# Patient Record
Sex: MALE | Race: AMERICAN INDIAN OR ALASKA NATIVE | NOT HISPANIC OR LATINO
[De-identification: names, ages, dates, MRNs, and addresses within clinical notes are randomized per-mention and may not be internally consistent; named-entity substitution may affect disease eponyms.]

---

## 2020-01-15 ENCOUNTER — TRANSCRIPTION ENCOUNTER (OUTPATIENT)
Age: 35
End: 2020-01-15

## 2020-02-21 ENCOUNTER — TRANSCRIPTION ENCOUNTER (OUTPATIENT)
Age: 35
End: 2020-02-21

## 2020-06-04 PROBLEM — Z00.00 ENCOUNTER FOR PREVENTIVE HEALTH EXAMINATION: Status: ACTIVE | Noted: 2020-06-04

## 2020-06-05 ENCOUNTER — APPOINTMENT (OUTPATIENT)
Dept: ORTHOPEDIC SURGERY | Facility: CLINIC | Age: 35
End: 2020-06-05
Payer: MEDICAID

## 2020-06-05 VITALS
BODY MASS INDEX: 25.01 KG/M2 | SYSTOLIC BLOOD PRESSURE: 101 MMHG | WEIGHT: 165 LBS | DIASTOLIC BLOOD PRESSURE: 69 MMHG | HEART RATE: 78 BPM | HEIGHT: 68 IN

## 2020-06-05 VITALS — TEMPERATURE: 98.1 F

## 2020-06-05 DIAGNOSIS — S83.241A OTHER TEAR OF MEDIAL MENISCUS, CURRENT INJURY, RIGHT KNEE, INITIAL ENCOUNTER: ICD-10-CM

## 2020-06-05 PROCEDURE — 73562 X-RAY EXAM OF KNEE 3: CPT | Mod: RT

## 2020-06-05 PROCEDURE — 99204 OFFICE O/P NEW MOD 45 MIN: CPT

## 2020-06-13 ENCOUNTER — TRANSCRIPTION ENCOUNTER (OUTPATIENT)
Age: 35
End: 2020-06-13

## 2020-06-13 ENCOUNTER — APPOINTMENT (OUTPATIENT)
Dept: MRI IMAGING | Facility: CLINIC | Age: 35
End: 2020-06-13
Payer: MEDICAID

## 2020-06-13 ENCOUNTER — OUTPATIENT (OUTPATIENT)
Dept: OUTPATIENT SERVICES | Facility: HOSPITAL | Age: 35
LOS: 1 days | End: 2020-06-13

## 2020-06-13 DIAGNOSIS — S83.241A OTHER TEAR OF MEDIAL MENISCUS, CURRENT INJURY, RIGHT KNEE, INITIAL ENCOUNTER: ICD-10-CM

## 2020-06-13 PROCEDURE — 73721 MRI JNT OF LWR EXTRE W/O DYE: CPT | Mod: 26,RT

## 2020-06-17 ENCOUNTER — APPOINTMENT (OUTPATIENT)
Dept: ORTHOPEDIC SURGERY | Facility: CLINIC | Age: 35
End: 2020-06-17
Payer: MEDICAID

## 2020-06-17 DIAGNOSIS — M25.561 PAIN IN RIGHT KNEE: ICD-10-CM

## 2020-06-17 DIAGNOSIS — G89.29 PAIN IN RIGHT KNEE: ICD-10-CM

## 2020-06-17 PROCEDURE — 99214 OFFICE O/P EST MOD 30 MIN: CPT

## 2020-11-15 ENCOUNTER — TRANSCRIPTION ENCOUNTER (OUTPATIENT)
Age: 35
End: 2020-11-15

## 2020-12-16 ENCOUNTER — TRANSCRIPTION ENCOUNTER (OUTPATIENT)
Age: 35
End: 2020-12-16

## 2021-02-04 ENCOUNTER — TRANSCRIPTION ENCOUNTER (OUTPATIENT)
Age: 36
End: 2021-02-04

## 2021-03-17 ENCOUNTER — EMERGENCY (EMERGENCY)
Facility: HOSPITAL | Age: 36
LOS: 1 days | Discharge: HOME | End: 2021-03-17
Attending: HOSPITALIST | Admitting: HOSPITALIST
Payer: MEDICAID

## 2021-03-17 VITALS
HEART RATE: 98 BPM | OXYGEN SATURATION: 100 % | SYSTOLIC BLOOD PRESSURE: 140 MMHG | DIASTOLIC BLOOD PRESSURE: 88 MMHG | RESPIRATION RATE: 20 BRPM | TEMPERATURE: 99 F

## 2021-03-17 LAB
BASE EXCESS BLDV CALC-SCNC: 3 MMOL/L — HIGH (ref -3–2)
BASOPHILS # BLD AUTO: 0.05 K/UL — SIGNIFICANT CHANGE UP (ref 0–0.2)
BASOPHILS NFR BLD AUTO: 0.5 % — SIGNIFICANT CHANGE UP (ref 0–2)
BLOOD GAS VENOUS - CREATININE: 0.8 MG/DL — SIGNIFICANT CHANGE UP (ref 0.5–1.3)
BLOOD GAS VENOUS COMPREHENSIVE RESULT: SIGNIFICANT CHANGE UP
CHLORIDE BLDV-SCNC: 107 MMOL/L — SIGNIFICANT CHANGE UP (ref 96–108)
D DIMER BLD IA.RAPID-MCNC: <150 NG/ML DDU — SIGNIFICANT CHANGE UP
EOSINOPHIL # BLD AUTO: 0.22 K/UL — SIGNIFICANT CHANGE UP (ref 0–0.5)
EOSINOPHIL NFR BLD AUTO: 2.3 % — SIGNIFICANT CHANGE UP (ref 0–6)
GAS PNL BLDV: 138 MMOL/L — SIGNIFICANT CHANGE UP (ref 136–146)
GLUCOSE BLDV-MCNC: 75 MG/DL — SIGNIFICANT CHANGE UP (ref 70–99)
HCO3 BLDV-SCNC: 24 MMOL/L — SIGNIFICANT CHANGE UP (ref 20–27)
HCT VFR BLD CALC: 47.4 % — SIGNIFICANT CHANGE UP (ref 39–50)
HCT VFR BLDA CALC: 48.5 % — SIGNIFICANT CHANGE UP (ref 39–51)
HGB BLD CALC-MCNC: 15.8 G/DL — SIGNIFICANT CHANGE UP (ref 13–17)
HGB BLD-MCNC: 15.6 G/DL — SIGNIFICANT CHANGE UP (ref 13–17)
IANC: 4.86 K/UL — SIGNIFICANT CHANGE UP (ref 1.5–8.5)
IMM GRANULOCYTES NFR BLD AUTO: 0.3 % — SIGNIFICANT CHANGE UP (ref 0–1.5)
LACTATE BLDV-MCNC: 2.3 MMOL/L — HIGH (ref 0.5–2)
LYMPHOCYTES # BLD AUTO: 3.5 K/UL — HIGH (ref 1–3.3)
LYMPHOCYTES # BLD AUTO: 36.6 % — SIGNIFICANT CHANGE UP (ref 13–44)
MCHC RBC-ENTMCNC: 28 PG — SIGNIFICANT CHANGE UP (ref 27–34)
MCHC RBC-ENTMCNC: 32.9 GM/DL — SIGNIFICANT CHANGE UP (ref 32–36)
MCV RBC AUTO: 85.1 FL — SIGNIFICANT CHANGE UP (ref 80–100)
MONOCYTES # BLD AUTO: 0.91 K/UL — HIGH (ref 0–0.9)
MONOCYTES NFR BLD AUTO: 9.5 % — SIGNIFICANT CHANGE UP (ref 2–14)
NEUTROPHILS # BLD AUTO: 4.86 K/UL — SIGNIFICANT CHANGE UP (ref 1.8–7.4)
NEUTROPHILS NFR BLD AUTO: 50.8 % — SIGNIFICANT CHANGE UP (ref 43–77)
NRBC # BLD: 0 /100 WBCS — SIGNIFICANT CHANGE UP
NRBC # FLD: 0 K/UL — SIGNIFICANT CHANGE UP
PCO2 BLDV: 58 MMHG — HIGH (ref 41–51)
PH BLDV: 7.32 — SIGNIFICANT CHANGE UP (ref 7.32–7.43)
PLATELET # BLD AUTO: 191 K/UL — SIGNIFICANT CHANGE UP (ref 150–400)
PO2 BLDV: <24 MMHG — SIGNIFICANT CHANGE UP (ref 35–40)
POTASSIUM BLDV-SCNC: 3.9 MMOL/L — SIGNIFICANT CHANGE UP (ref 3.4–4.5)
RBC # BLD: 5.57 M/UL — SIGNIFICANT CHANGE UP (ref 4.2–5.8)
RBC # FLD: 13.9 % — SIGNIFICANT CHANGE UP (ref 10.3–14.5)
SAO2 % BLDV: 33 % — LOW (ref 60–85)
WBC # BLD: 9.57 K/UL — SIGNIFICANT CHANGE UP (ref 3.8–10.5)
WBC # FLD AUTO: 9.57 K/UL — SIGNIFICANT CHANGE UP (ref 3.8–10.5)

## 2021-03-17 PROCEDURE — 99285 EMERGENCY DEPT VISIT HI MDM: CPT | Mod: 25

## 2021-03-17 PROCEDURE — 93010 ELECTROCARDIOGRAM REPORT: CPT

## 2021-03-17 PROCEDURE — 71046 X-RAY EXAM CHEST 2 VIEWS: CPT | Mod: 26

## 2021-03-17 NOTE — ED PROVIDER NOTE - PATIENT PORTAL LINK FT
You can access the FollowMyHealth Patient Portal offered by [facility] by registering at the following website: [email or web address]. By joining [facility]’s FollowMyHealth portal, you will also be able to view your health information using other applications (apps) compatible with our system.

## 2021-03-17 NOTE — ED ADULT TRIAGE NOTE - CHIEF COMPLAINT QUOTE
Pt is c/o LEWIS since three days ago. Pt says he is having difficulty breathing after walking from one room to the other. Since this evening has chest pain when he takes a deep breath. Denies radiation of pain or dizziness or sweating but had palpitation earlier this afternoon. Pt is  also c/o abdominal pain on and off for a month for which he saw a doctor and took some medicine which did not help. No PMH and is not on any medications at home.

## 2021-03-17 NOTE — ED ADULT NURSE NOTE - OBJECTIVE STATEMENT
35 year old male  complaining of LEWIS, chest pain, palpitations for 3 days. Pt states just walking from one room to another he gets SOB and he could feel his heart beating fast. Pt. states he took his heart rate and it was 125. Pt is mostly on the left side  and described as pinching. Pt denies any cardiac hx, history of blood clots. Pt denies swelling in b/l le. Pt denies fever, chills, nausea, vomiting, headache, dizziness. Pt denies sicks contacts,.20g placed on left a/c labs sent.Pt awaiting imaging. 35 year old male  complaining of LEWIS, chest pain, palpitations for 3 days. Pt states just walking from one room to another he gets SOB and he could feel his heart beating fast. Pt. states he took his heart rate and it was 125. Pt is mostly on the left side  and described as pinching. Pt denies any cardiac hx, history of blood clots. Pt denies swelling in b/l le. Pt denies fever, chills, nausea, vomiting, headache, dizziness. Pt denies sicks contacts. Lungs clear, s1, s2 present. 20g placed on left a/c labs sent. Pt awaiting imaging.

## 2021-03-17 NOTE — ED PROVIDER NOTE - ATTENDING CONTRIBUTION TO CARE
35M with hx of hypertriglyceridemia not on medications p/w garcia for the past 3 days. patient states he feels very sob after walking short distances and feels his heart facing. also reports left sided pinching chest pain. non-radiating. no cough, fever. + tachycardia. no le edema, smoking or travel.    ***GEN - NAD; well appearing; A+O x3 ***HEAD - NC/AT ***EYES/NOSE - PERRL, EOMI, mucous membranes moist, no discharge ***THROAT: Oral cavity and pharynx normal. No inflammation, swelling, exudate, or lesions.  ***NECK: Neck supple, non-tender without lymphadenopathy, no masses, no thyromegaly.   ***PULMONARY - CTA b/l, symmetric breath sounds. ***CARDIAC -s1s2, tachycardiac, no M,G,R  ***ABDOMEN - +BS, ND, NT, soft, no guarding, no rebound, no masses   ***BACK - no CVA tenderness, Normal  spine ***EXTREMITIES - symmetric pulses, 2+ dp, capillary refill < 2 seconds, no clubbing, no cyanosis, no edema ***SKIN - no rash or bruising   ***NEUROLOGIC - alert, CN 2-12 intact    MDM: 35M with garcia and chest pain. r/o pe, acs.

## 2021-03-17 NOTE — ED PROVIDER NOTE - OBJECTIVE STATEMENT
36 y/o M h/o HLD p/w LEWIS x 3 days. Pt reports becoming SOB after walking short distances and than begins to feels his heart beating fast. Pt reports L sided pinching chest pain. States he checked his heart rate last night and it was 125 while at rest. Pt denies any h/o PE/DVT. No family history of CAD. No fever, chills, n/v/d, headache, dizziness.

## 2021-03-17 NOTE — ED PROVIDER NOTE - CLINICAL SUMMARY MEDICAL DECISION MAKING FREE TEXT BOX
36 y/o M with LEWIS a/w palpitations, chest pain.   plan  - labs  - trop  - ekg  - cxr  - d-dimer  - reassess CDU vs d/c with prompt cardiology f/u

## 2021-03-18 VITALS
OXYGEN SATURATION: 100 % | TEMPERATURE: 98 F | SYSTOLIC BLOOD PRESSURE: 117 MMHG | HEART RATE: 89 BPM | RESPIRATION RATE: 16 BRPM | DIASTOLIC BLOOD PRESSURE: 78 MMHG

## 2021-03-18 LAB
ALBUMIN SERPL ELPH-MCNC: 5.2 G/DL — HIGH (ref 3.3–5)
ALP SERPL-CCNC: 107 U/L — SIGNIFICANT CHANGE UP (ref 40–120)
ALT FLD-CCNC: 175 U/L — HIGH (ref 4–41)
ANION GAP SERPL CALC-SCNC: 15 MMOL/L — HIGH (ref 7–14)
AST SERPL-CCNC: 86 U/L — HIGH (ref 4–40)
BILIRUB SERPL-MCNC: 0.5 MG/DL — SIGNIFICANT CHANGE UP (ref 0.2–1.2)
BUN SERPL-MCNC: 10 MG/DL — SIGNIFICANT CHANGE UP (ref 7–23)
CALCIUM SERPL-MCNC: 10.3 MG/DL — SIGNIFICANT CHANGE UP (ref 8.4–10.5)
CHLORIDE SERPL-SCNC: 99 MMOL/L — SIGNIFICANT CHANGE UP (ref 98–107)
CO2 SERPL-SCNC: 23 MMOL/L — SIGNIFICANT CHANGE UP (ref 22–31)
CREAT SERPL-MCNC: 0.81 MG/DL — SIGNIFICANT CHANGE UP (ref 0.5–1.3)
GLUCOSE SERPL-MCNC: 68 MG/DL — LOW (ref 70–99)
NT-PROBNP SERPL-SCNC: 10 PG/ML — SIGNIFICANT CHANGE UP
POTASSIUM SERPL-MCNC: 4.3 MMOL/L — SIGNIFICANT CHANGE UP (ref 3.5–5.3)
POTASSIUM SERPL-SCNC: 4.3 MMOL/L — SIGNIFICANT CHANGE UP (ref 3.5–5.3)
PROT SERPL-MCNC: 8.4 G/DL — HIGH (ref 6–8.3)
SARS-COV-2 RNA SPEC QL NAA+PROBE: SIGNIFICANT CHANGE UP
SODIUM SERPL-SCNC: 137 MMOL/L — SIGNIFICANT CHANGE UP (ref 135–145)
TROPONIN T, HIGH SENSITIVITY RESULT: 7 NG/L — SIGNIFICANT CHANGE UP
TROPONIN T, HIGH SENSITIVITY RESULT: <6 NG/L — SIGNIFICANT CHANGE UP

## 2021-08-20 ENCOUNTER — TRANSCRIPTION ENCOUNTER (OUTPATIENT)
Age: 36
End: 2021-08-20

## 2021-11-10 NOTE — ED ADULT NURSE NOTE - NSFALLRSKPASTHIST_ED_ALL_ED
How Severe Is Your Skin Lesion?: moderate Is This A New Presentation, Or A Follow-Up?: Skin Lesion I will SWITCH the dose or number of times a day I take the medications listed below when I get home from the hospital:  None no

## 2021-11-22 NOTE — ED PROVIDER NOTE - PROGRESS NOTE DETAILS
Quality 137: Melanoma: Continuity Of Care - Recall System: Recall system not utilized, reason not otherwise specified Quality 397: Melanoma: Reporting: Pathology does not include pT category and/or statement on Breslow depth, ulceration, and pT1 mitotic reporting. Quality 431: Preventive Care And Screening: Unhealthy Alcohol Use - Screening: Patient did not receive brief counseling if identified as an unhealthy alcohol user, reason not given Quality 337: Tuberculosis Prevention For Psoriasis And Psoriatic Arthritis Patients On A Biological Immune Response Modifier: No documentation of negative or managed positive TB screen Quality 410: Psoriasis Clinical Response To Oral Systemic Or Biologic Mediations: Psoriasis Assessment Tool NOT Documented Quality 47: Advance Care Plan: Advance care planning not documented, reason not otherwise specified. Detail Level: Detailed Quality 138: Melanoma: Coordination Of Care: Treatment plan not communicated, reason not otherwise specified. Quality 176: Tuberculosis Screening Prior To First Course Biologic And/Or Immune Response Modifier Therapy: Pt receiving first-time biologic DMARD, TB Screening Not Performed, Reason not Otherwise Specified Quality 130: Documentation Of Current Medications In The Medical Record: Current Medications Documented Pt feeling better. Trop negative x 2. EKG NSR. Labs unremarkable. LFTs elevated, pt states he is aware and is following with PCP for it. CXR negative. Given referral to cardiology. Advised to follow up with PCP within 1-2 days. Instructed to return to Emergency Department if any new or worsening symptoms. Pt verbalizes agreement and understanding. VSS. Pt feeling better. Trop negative x 2. EKG NSR. Labs unremarkable. LFTs elevated, pt states he is aware and is following with PCP for it. CXR negative. Offered CDU for echo, however pt would rather f/u with cardiology as outpatient. Given referral to cardiology. Advised to follow up with PCP within 1-2 days. Instructed to return to Emergency Department if any new or worsening symptoms. Pt verbalizes agreement and understanding. VSS.

## 2021-12-23 ENCOUNTER — TRANSCRIPTION ENCOUNTER (OUTPATIENT)
Age: 36
End: 2021-12-23

## 2022-04-13 PROBLEM — E78.5 HYPERLIPIDEMIA, UNSPECIFIED: Chronic | Status: ACTIVE | Noted: 2021-03-17

## 2022-04-14 ENCOUNTER — APPOINTMENT (OUTPATIENT)
Dept: CT IMAGING | Facility: CLINIC | Age: 37
End: 2022-04-14
Payer: MEDICAID

## 2022-04-14 ENCOUNTER — OUTPATIENT (OUTPATIENT)
Dept: OUTPATIENT SERVICES | Facility: HOSPITAL | Age: 37
LOS: 1 days | End: 2022-04-14

## 2022-04-14 PROCEDURE — 75574 CT ANGIO HRT W/3D IMAGE: CPT | Mod: 26

## 2022-04-18 ENCOUNTER — APPOINTMENT (OUTPATIENT)
Dept: CT IMAGING | Facility: CLINIC | Age: 37
End: 2022-04-18

## 2022-10-28 ENCOUNTER — APPOINTMENT (OUTPATIENT)
Dept: ULTRASOUND IMAGING | Facility: CLINIC | Age: 37
End: 2022-10-28

## 2022-10-28 PROCEDURE — 76700 US EXAM ABDOM COMPLETE: CPT

## 2023-07-29 ENCOUNTER — EMERGENCY (EMERGENCY)
Facility: HOSPITAL | Age: 38
LOS: 1 days | Discharge: HOME | End: 2023-07-29
Attending: EMERGENCY MEDICINE | Admitting: EMERGENCY MEDICINE
Payer: MEDICAID

## 2023-07-29 VITALS
HEART RATE: 98 BPM | OXYGEN SATURATION: 98 % | DIASTOLIC BLOOD PRESSURE: 80 MMHG | SYSTOLIC BLOOD PRESSURE: 115 MMHG | RESPIRATION RATE: 18 BRPM | TEMPERATURE: 98 F

## 2023-07-29 VITALS
DIASTOLIC BLOOD PRESSURE: 73 MMHG | SYSTOLIC BLOOD PRESSURE: 112 MMHG | TEMPERATURE: 98 F | HEIGHT: 69 IN | WEIGHT: 167.99 LBS | OXYGEN SATURATION: 98 % | HEART RATE: 97 BPM | RESPIRATION RATE: 18 BRPM

## 2023-07-29 PROCEDURE — 99283 EMERGENCY DEPT VISIT LOW MDM: CPT

## 2023-07-29 PROCEDURE — 99283 EMERGENCY DEPT VISIT LOW MDM: CPT | Mod: 25

## 2023-07-29 PROCEDURE — 10060 I&D ABSCESS SIMPLE/SINGLE: CPT

## 2023-07-29 RX ORDER — IBUPROFEN 200 MG
600 TABLET ORAL ONCE
Refills: 0 | Status: COMPLETED | OUTPATIENT
Start: 2023-07-29 | End: 2023-07-29

## 2023-07-29 RX ADMIN — Medication 1 TABLET(S): at 16:34

## 2023-07-29 RX ADMIN — Medication 600 MILLIGRAM(S): at 16:34

## 2023-07-29 NOTE — ED ADULT TRIAGE NOTE - WEIGHT IN KG
Eat healthy foods you enjoy. Apixaban/Eliquis DOES NOT have a special diet. Limit your alcohol intake. 76.2

## 2023-07-29 NOTE — ED PROVIDER NOTE - OBJECTIVE STATEMENT
38-year-old male with no significant past medical history presents to the emergency department complaining of a right groin abscess.  Patient states that 4 days ago started as a small pimple, and gradually progressed to redness and tenderness and had gotten bigger.  Patient states that he felt like he had a fever last night no urinary complaints, no vomiting.

## 2023-07-29 NOTE — ED ADULT NURSE NOTE - OBJECTIVE STATEMENT
patient comes from home with complaints of pain/redness & skin irritated by cyst/mass by rt inguinal/upper thigh for 3days.patient states that it is tender/painful to touch.

## 2023-07-29 NOTE — ED PROVIDER NOTE - PATIENT PORTAL LINK FT
You can access the FollowMyHealth Patient Portal offered by [facility] by registering at the following website: [email or web address]. By joining [facility]’s FollowMyHealth portal, you will also be able to view your health information using other applications (apps) compatible with our system.

## 2023-07-29 NOTE — ED ADULT NURSE NOTE - NSFALLUNIVINTERV_ED_ALL_ED
Bed/Stretcher in lowest position, wheels locked, appropriate side rails in place/Call bell, personal items and telephone in reach/Instruct patient to call for assistance before getting out of bed/chair/stretcher/Non-slip footwear applied when patient is off stretcher/[place] to call system/Physically safe environment - no spills, clutter or unnecessary equipment/Purposeful proactive rounding/Room/bathroom lighting operational, light cord in reach

## 2023-07-29 NOTE — ED ADULT TRIAGE NOTE - CHIEF COMPLAINT QUOTE
38M pt from home, c/o pain/redness & skin irritated by cyst/mass by rt inguinal/upper thigh.  x3days. 2-3 inches. tender/painful to touch. 08/10 pain scale

## 2023-07-29 NOTE — ED PROVIDER NOTE - CARE PROVIDER_API CALL
Stepan Alva  Surgery  [address] [place] [address]  Phone: [phone]  Fax: [phone]  Follow Up Time: 1-3 Days

## 2023-07-29 NOTE — ED PROVIDER NOTE - NSFOLLOWUPINSTRUCTIONS_ED_ALL_ED_FT
Follow-up with your primary care doctor for a wound check in 2 days.  Take Bactrim antibiotic as directed.  Return to ER if having prolonged fevers, increased pain swelling and redness and or worsening of symptoms.  May keep wound clean with soap and water and cover with dressing.

## 2024-12-02 NOTE — ED PROCEDURE NOTE - PROCEDURE DATE TIME, MLM
Patient called asking for a refill of following, advised a script was sent on 11/26 for the advair inhaler. Called pharmacy to make sure they received it. Pharmacy stated they did not receive a script for the Advair they just received a script for Albuterol. Please resend a refill of Advair to [facility] pharmacy. Thank you  
29-Jul-2023 17:17